# Patient Record
Sex: FEMALE
[De-identification: names, ages, dates, MRNs, and addresses within clinical notes are randomized per-mention and may not be internally consistent; named-entity substitution may affect disease eponyms.]

---

## 2022-09-21 PROBLEM — Z00.00 ENCOUNTER FOR PREVENTIVE HEALTH EXAMINATION: Status: ACTIVE | Noted: 2022-09-21

## 2022-09-27 ENCOUNTER — APPOINTMENT (OUTPATIENT)
Dept: NEUROSURGERY | Facility: CLINIC | Age: 57
End: 2022-09-27

## 2022-10-04 ENCOUNTER — APPOINTMENT (OUTPATIENT)
Dept: NEUROSURGERY | Facility: CLINIC | Age: 57
End: 2022-10-04

## 2022-10-04 DIAGNOSIS — G51.31 CLONIC HEMIFACIAL SPASM, RIGHT: ICD-10-CM

## 2022-10-04 PROCEDURE — 99205 OFFICE O/P NEW HI 60 MIN: CPT

## 2022-10-04 NOTE — ASSESSMENT
[FreeTextEntry1] : Ms. Carson presents with long history of right hemifacial spasm since 2006. Over the last several years, paroxysmal hemifacial spasm has modestly increased in frequency and severity. Noted to have classic appearing right orbicularis oculi and zygomaticus facial spam upon examination today. \par \par Alternative management strategies were discussed in detail along with natural history. Risks of microvascular decompression including but not limited to bleeding, infection, facial numbness, anesthesia dolorosa, facial weakness/paralysis either temporary or permanent, complete hearing loss, double vision, CSF leak, brain hemorrhage, stroke, coma/death, weakness/paralysis, visual/sensory loss, loss of speech/language/cognitive/memory function, changes in personality/behavior, swallowing difficulties, failure to improve, recurrence, need for subsequent procedures were discussed. I explained that microvascular decompression has the most efficacy and durability in the treatment of hemifacial spasm, but that the primary consideration to proceed depends heavily on how her quality of life is affected by hemifacial spasm.\par \par I have requested MRI brain and IACs with and without gadolinium to evaluate the angioarchitecture of the right cerebellopontine cistern and to investigate for an occult structural etiology of the hemifacial spasm. I will see her following the MRI to discuss the results and management strategy going forward.\par \par I have asked the patient to contact me for any symptomatic development or progression in the interim at which time we can obtain expedited follow up imaging.\par \par A total of 60 minutes were spent relative to this encounter.\par

## 2022-10-04 NOTE — PHYSICAL EXAM
[General Appearance - Alert] : alert [General Appearance - In No Acute Distress] : in no acute distress [General Appearance - Well Nourished] : well nourished [Oriented To Time, Place, And Person] : oriented to person, place, and time [Impaired Insight] : insight and judgment were intact [Affect] : the affect was normal [Cranial Nerves Optic (II)] : visual acuity intact bilaterally,  pupils equal round and reactive to light [Cranial Nerves Oculomotor (III)] : extraocular motion intact [Cranial Nerves Glossopharyngeal (IX)] : tongue and palate midline [Cranial Nerves Accessory (XI - Cranial And Spinal)] : head turning and shoulder shrug symmetric [Cranial Nerves Hypoglossal (XII)] : there was no tongue deviation with protrusion [Motor Tone] : muscle tone was normal in all four extremities [Motor Strength] : muscle strength was normal in all four extremities [Sensation Tactile Decrease] : light touch was intact [Abnormal Walk] : normal gait [Balance] : balance was intact [Cranial Nerves Trigeminal (V)] : facial sensation intact symmetrically [Cranial Nerves Facial (VII)] : face symmetrical [Cranial Nerves Vestibulocochlear (VIII)] : hearing was intact bilaterally [FreeTextEntry5] : Right orbicularis oculi and zygomaticus hemifacial spasms

## 2022-10-04 NOTE — HISTORY OF PRESENT ILLNESS
[de-identified] : Ms. Carson presents in neurosurgical consultation at the request of Dr. Divya Graham . She has a PMHx of basal cell carcinoma and right sided hemifacial spasm since 2006. Patient endorses a history of right hemifacial spasm primarily localized around her right eye. Throughout the years, hemifacial spasm has fluctuated in severity and frequency, and has included prolonged periods of quiescence. Knowing this, she never underwent imaging or followed up with neurologist, Dr. Deon Levine, after her initial evaluation.. Within the last 2-3 years, right eye spasm has increased in severity and frequency and she describes this as an "annoyance", though it has not severely affected her quality of life. She has recently been considering Botox injections for medical management and was evaluated by Dr. Graham to this effect. \par \par Upon physical examination today, patient noted to have active right orbicularis oculi and zygomaticus hemifacial spasm during the consultation. Facial sensation is normal and symmetric in all trigeminal distributions. Denies mastication difficulties, hearing loss. Denies other neurological symptoms at this time.

## 2022-10-05 PROBLEM — G51.31 HEMIFACIAL SPASM OF RIGHT SIDE OF FACE: Status: ACTIVE | Noted: 2022-10-05
